# Patient Record
Sex: FEMALE | Race: WHITE | NOT HISPANIC OR LATINO
[De-identification: names, ages, dates, MRNs, and addresses within clinical notes are randomized per-mention and may not be internally consistent; named-entity substitution may affect disease eponyms.]

---

## 2017-07-27 ENCOUNTER — APPOINTMENT (OUTPATIENT)
Dept: OBGYN | Facility: CLINIC | Age: 22
End: 2017-07-27
Payer: COMMERCIAL

## 2017-07-27 PROBLEM — Z00.00 ENCOUNTER FOR PREVENTIVE HEALTH EXAMINATION: Status: ACTIVE | Noted: 2017-07-27

## 2017-07-27 PROCEDURE — 99385 PREV VISIT NEW AGE 18-39: CPT

## 2017-07-27 PROCEDURE — 99203 OFFICE O/P NEW LOW 30 MIN: CPT | Mod: 25

## 2018-01-27 ENCOUNTER — HOSPITAL ENCOUNTER (EMERGENCY)
Dept: HOSPITAL 25 - UCCORT | Age: 23
Discharge: HOME | End: 2018-01-27
Payer: COMMERCIAL

## 2018-01-27 VITALS — DIASTOLIC BLOOD PRESSURE: 70 MMHG | SYSTOLIC BLOOD PRESSURE: 105 MMHG

## 2018-01-27 DIAGNOSIS — L50.9: Primary | ICD-10-CM

## 2018-01-27 PROCEDURE — 99202 OFFICE O/P NEW SF 15 MIN: CPT

## 2018-01-27 PROCEDURE — G0463 HOSPITAL OUTPT CLINIC VISIT: HCPCS

## 2018-01-27 NOTE — UC
Allergic Reaction HPI





- HPI Summary


HPI Summary: 





23 yo female with the onset of hives yesterday AM


Similar symptoms (but less severe) one week ago


no recent illness


no new meds


no new foods


no new soaps or detergents





no facial/tongue edema


no throat tightness or stridor


no n/v


no cp or sob


she does state her breathing feels "weird"





no hx asthma





states she is always tachycardic when she a doctor due to nerves











- History of Current Complaint


Chief Complaint: UCAllergicReaction


Stated Complaint: SKIN COMPLAINT, SOB


Time Seen by Provider: 01/27/18 13:36


Hx Obtained From: Patient


Hx Last Menstrual Period: APPROX 1 WK AGO


Onset/Duration: Gradual Onset, Lasting Days


Severity Initially: Mild


Severity Currently: Moderate


Pain Intensity: 0 - very comfortable due to pruritis


Pain Scale Used: 0-10 Numeric


Location: Diffuse


Character: Swelling, Pruritus, Hives


Aggravating Factor(s): Nothing


Alleviating Factor(s): Antihistamines - last took some yesterday PM


Associated Signs And Symptoms: Positive: Rash





- Related Hx


Possible Reaction To: Unknown





- Allergies/Home Medications


Allergies/Adverse Reactions: 


 Allergies











Allergy/AdvReac Type Severity Reaction Status Date / Time


 


No Known Allergies Allergy   Verified 01/27/18 13:19











Home Medications: 


 Home Medications





Diphenhydramine HCl [Benadryl Allergy 25 MG CAP] 25 mg PO DAILY PRN 01/27/18 [

History Confirmed 01/27/18]


Multiple Vitamins W/ Minerals [Multivitamin Adults] 1 tab PO DAILY 01/27/18 [

History Confirmed 01/27/18]











PMH/Surg Hx/FS Hx/Imm Hx


Previously Healthy: Yes





- Surgical History


Surgical History: None





- Family History


Known Family History: Positive: Hypertension





- Social History


Alcohol Use: Occasionally


Substance Use Type: None


Smoking Status (MU): Never Smoked Tobacco





Review of Systems


Constitutional: Negative


Skin: Rash


Eyes: Negative


ENT: Negative


Respiratory: Negative


Cardiovascular: Negative


Gastrointestinal: Negative


Genitourinary: Negative


Motor: Negative


Neurovascular: Negative


Musculoskeletal: Negative


Neurological: Negative


Psychological: Negative


Is Patient Immunocompromised?: No


All Other Systems Reviewed And Are Negative: Yes





Physical Exam


Triage Information Reviewed: Yes


Appearance: Well-Appearing, No Pain Distress, Well-Nourished


Vital Signs: 


 Initial Vital Signs











Temp  97.8 F   01/27/18 13:21


 


Pulse  114   01/27/18 13:21


 


Resp  16   01/27/18 13:21


 


BP  105/70   01/27/18 13:21


 


Pulse Ox  100   01/27/18 13:21











Vital Signs Reviewed: Yes


Eyes: Positive: Conjunctiva Clear


ENT: Positive: Normal ENT inspection, Other - no stridor/no angioedema


Neck exam: Normal


Neck: Positive: Supple, Nontender, No Lymphadenopathy


Respiratory: Positive: Lungs clear, Normal breath sounds, No respiratory 

distress


Cardiovascular: Positive: RRR, No Murmur, Tachycardia


Musculoskeletal: Positive: ROM Intact, No Edema


Neurological: Positive: Alert


Psychological Exam: Normal


Skin Exam: Normal





Allergic Reaction Course/Dx





- Differential Dx/Diagnosis


Provider Diagnoses: urticaria





Discharge





- Discharge Plan


Condition: Stable


Disposition: HOME


Prescriptions: 


Prednisone 60 mg PO DAILY #6 tab


Patient Education Materials:  Urticaria (ED)


Referrals: 


Rubinstein,Elliot M, MD [Medical Doctor] -  (ask for Aidan appt)


Additional Instructions: 


benadryl 25 mg  2 pills 4x day ss needed for itching





will cause drowsiness





I will be here until 10 PM


please call if you have any questions or concerns





you need to be rechecked ASAP for 


throat tightness


tongue swelling


worsening symptoms

## 2018-12-31 ENCOUNTER — APPOINTMENT (OUTPATIENT)
Dept: OBGYN | Facility: CLINIC | Age: 23
End: 2018-12-31
Payer: COMMERCIAL

## 2018-12-31 ENCOUNTER — RESULT REVIEW (OUTPATIENT)
Age: 23
End: 2018-12-31

## 2018-12-31 PROCEDURE — 99395 PREV VISIT EST AGE 18-39: CPT

## 2021-02-28 ENCOUNTER — FORM ENCOUNTER (OUTPATIENT)
Age: 26
End: 2021-02-28

## 2021-02-28 ENCOUNTER — RESULT REVIEW (OUTPATIENT)
Age: 26
End: 2021-02-28

## 2021-03-01 ENCOUNTER — FORM ENCOUNTER (OUTPATIENT)
Age: 26
End: 2021-03-01

## 2021-03-01 ENCOUNTER — APPOINTMENT (OUTPATIENT)
Dept: OBGYN | Facility: CLINIC | Age: 26
End: 2021-03-01
Payer: COMMERCIAL

## 2021-03-01 PROCEDURE — 99072 ADDL SUPL MATRL&STAF TM PHE: CPT

## 2021-03-01 PROCEDURE — 99395 PREV VISIT EST AGE 18-39: CPT

## 2021-03-07 ENCOUNTER — FORM ENCOUNTER (OUTPATIENT)
Age: 26
End: 2021-03-07

## 2022-01-20 DIAGNOSIS — Z30.9 ENCOUNTER FOR CONTRACEPTIVE MANAGEMENT, UNSPECIFIED: ICD-10-CM

## 2022-01-20 RX ORDER — DESOGESTREL/ETHINYL ESTRADIOL AND ETHINYL ESTRADIOL 21-5 (28)
0.15-0.02/0.01 KIT ORAL DAILY
Qty: 3 | Refills: 0 | Status: ACTIVE | COMMUNITY
Start: 2022-01-20 | End: 1900-01-01

## 2022-03-31 ENCOUNTER — NON-APPOINTMENT (OUTPATIENT)
Age: 27
End: 2022-03-31

## 2022-03-31 DIAGNOSIS — Z98.890 OTHER SPECIFIED POSTPROCEDURAL STATES: ICD-10-CM

## 2022-03-31 DIAGNOSIS — Z87.42 PERSONAL HISTORY OF OTHER DISEASES OF THE FEMALE GENITAL TRACT: ICD-10-CM

## 2022-03-31 DIAGNOSIS — Z78.9 OTHER SPECIFIED HEALTH STATUS: ICD-10-CM

## 2022-03-31 DIAGNOSIS — Z80.3 FAMILY HISTORY OF MALIGNANT NEOPLASM OF BREAST: ICD-10-CM

## 2022-03-31 RX ORDER — NEOMYCIN/POLYMYXIN B/PRAMOXINE 3.5-10K-1
CREAM (GRAM) TOPICAL
Refills: 0 | Status: ACTIVE | COMMUNITY

## 2022-04-14 ENCOUNTER — RX RENEWAL (OUTPATIENT)
Age: 27
End: 2022-04-14

## 2022-04-25 ENCOUNTER — LABORATORY RESULT (OUTPATIENT)
Age: 27
End: 2022-04-25

## 2022-04-25 ENCOUNTER — APPOINTMENT (OUTPATIENT)
Dept: OBGYN | Facility: CLINIC | Age: 27
End: 2022-04-25
Payer: COMMERCIAL

## 2022-04-25 VITALS
OXYGEN SATURATION: 99 % | RESPIRATION RATE: 16 BRPM | WEIGHT: 138 LBS | HEART RATE: 75 BPM | SYSTOLIC BLOOD PRESSURE: 126 MMHG | DIASTOLIC BLOOD PRESSURE: 73 MMHG | BODY MASS INDEX: 22.18 KG/M2 | HEIGHT: 66 IN

## 2022-04-25 DIAGNOSIS — Z01.419 ENCOUNTER FOR GYNECOLOGICAL EXAMINATION (GENERAL) (ROUTINE) W/OUT ABNORMAL FINDINGS: ICD-10-CM

## 2022-04-25 PROCEDURE — 99395 PREV VISIT EST AGE 18-39: CPT

## 2022-04-25 RX ORDER — DESOGESTREL AND ETHINYL ESTRADIOL 21-5 (28)
0.15-0.02/0.01 KIT ORAL
Qty: 84 | Refills: 3 | Status: ACTIVE | COMMUNITY
Start: 2022-04-14 | End: 1900-01-01

## 2022-04-25 NOTE — HISTORY OF PRESENT ILLNESS
[TextBox_4] : 25yo G0 presents for routine gyn exam. 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  New female partner but has not had intercourse yet.  Declines sti screen. \par \par Info. from prior EMR:\par Demographics:  Race: White Ethnicity: Not  or  Native Language: English Occupation:  Fiction Novels/ on weekends \par : 0  Para:  0 0 0 0 \par OB History: No significant OB history. \par GYN \par  dysmenorrhea, s/p Gardasil \par Menarche Age: 14 Cycle: monthly Duration: 4-7 days Flow: normal Sexually Active Single same sex partners \par PMH\par  No significant past medical history. \par Surgical History: No significant surgical history. \par Allergies: NKDA \par Current Medications: Prescribed/Suppliments/OTC MULTIVITAMIN GUMMIES \par Medications Verified Medications Verified \par Family Hx\par  Breast Cancer: mother (40s) \par  Personal history of blood clots/DVT/PE: no  \par  Personal history of conditions causing increased risk of blood clots/DVT/PE: no  \par  Family history of blood clots/DVT/PE: no  \par  Family history of conditions causing increased risk of blood clots/DVT/PE: no\par  [PapSmeardate] : 2021 [TextBox_31] : ASCUS, neg HPV 2021,  2018 LGSIL [Previously active] : previously active [Women] : women [Yes] : Yes [Patient refuses STI testing] : Patient refuses STI testing

## 2022-04-25 NOTE — PLAN
[FreeTextEntry1] : HCM\par -SBE\par -pap\par -declines STI screen\par -refilled \par \par RTO 1 year\par

## 2022-09-27 ENCOUNTER — OFFICE (OUTPATIENT)
Dept: URBAN - METROPOLITAN AREA CLINIC 109 | Facility: CLINIC | Age: 27
Setting detail: OPHTHALMOLOGY
End: 2022-09-27
Payer: COMMERCIAL

## 2022-09-27 DIAGNOSIS — H43.393: ICD-10-CM

## 2022-09-27 PROBLEM — H01.005 BLEPHARITIS; RIGHT LOWER LID, LEFT LOWER LID: Status: ACTIVE | Noted: 2022-09-27

## 2022-09-27 PROBLEM — H10.45 ALLERGIC CONJUNCTIVITIS: Status: ACTIVE | Noted: 2022-09-27

## 2022-09-27 PROBLEM — H01.002 BLEPHARITIS; RIGHT LOWER LID, LEFT LOWER LID: Status: ACTIVE | Noted: 2022-09-27

## 2022-09-27 PROCEDURE — 92004 COMPRE OPH EXAM NEW PT 1/>: CPT | Performed by: OPHTHALMOLOGY

## 2022-09-27 PROCEDURE — 92250 FUNDUS PHOTOGRAPHY W/I&R: CPT | Performed by: OPHTHALMOLOGY

## 2022-09-27 ASSESSMENT — REFRACTION_AUTOREFRACTION
OS_AXIS: 013
OD_CYLINDER: -0.75
OS_CYLINDER: -0.25
OD_AXIS: 180
OD_SPHERE: -0.25
OS_SPHERE: 0.00

## 2022-09-27 ASSESSMENT — SPHEQUIV_DERIVED
OD_SPHEQUIV: -0.625
OD_SPHEQUIV: -0.75
OS_SPHEQUIV: -0.125

## 2022-09-27 ASSESSMENT — REFRACTION_MANIFEST
OS_SPHERE: -0.25
OD_CYLINDER: -0.50
OD_VA1: 20/20
OD_SPHERE: -0.50
OD_AXIS: 180
OS_VA1: 20/20

## 2022-09-27 ASSESSMENT — VISUAL ACUITY
OD_BCVA: 20/20
OS_BCVA: 20/25+1

## 2022-09-27 ASSESSMENT — LID EXAM ASSESSMENTS
OD_BLEPHARITIS: T
OS_BLEPHARITIS: T

## 2022-09-27 ASSESSMENT — CONFRONTATIONAL VISUAL FIELD TEST (CVF)
OD_FINDINGS: FULL
OS_FINDINGS: FULL

## 2022-09-27 ASSESSMENT — TONOMETRY
OD_IOP_MMHG: 16
OS_IOP_MMHG: 17

## 2025-06-20 ENCOUNTER — APPOINTMENT (OUTPATIENT)
Dept: PULMONOLOGY | Facility: CLINIC | Age: 30
End: 2025-06-20